# Patient Record
Sex: MALE | Race: WHITE
[De-identification: names, ages, dates, MRNs, and addresses within clinical notes are randomized per-mention and may not be internally consistent; named-entity substitution may affect disease eponyms.]

---

## 2018-10-28 ENCOUNTER — HOSPITAL ENCOUNTER (EMERGENCY)
Dept: HOSPITAL 74 - JERFT | Age: 18
Discharge: HOME | End: 2018-10-28
Payer: COMMERCIAL

## 2018-10-28 VITALS — BODY MASS INDEX: 24.3 KG/M2

## 2018-10-28 VITALS — SYSTOLIC BLOOD PRESSURE: 107 MMHG | DIASTOLIC BLOOD PRESSURE: 63 MMHG | TEMPERATURE: 98.1 F | HEART RATE: 63 BPM

## 2018-10-28 DIAGNOSIS — J30.2: Primary | ICD-10-CM

## 2018-10-28 NOTE — PDOC
History of Present Illness





- General


Chief Complaint: Respiratory


Stated Complaint: CONGESTION


Time Seen by Provider: 10/28/18 11:32


History Source: Patient, Family


Exam Limitations: No Limitations





- History of Present Illness


Initial Comments: 





10/28/18 11:53


Patient came in for evaluation of worsened ALLERGIC rhinitis, itchy eyes and 

congestion over the past few days. States also suffers from pollen ALLERGIES 

and is use some Benadryl with minimal resolved. Denies fever, denies any. The 

drainage from nose, but has a moist nonproductive cough. States last night felt 

tight and felt like he was wheezing.


Timing/Duration: reports: getting worse


Severity: reports: mild, moderate


Possible Cause: Yes: no prior episodes, allergen exposure, chronic episodes, 

occasional episodes


Modifying Factors: improves with: activity, coughing


Associated Symptoms: reports: denies symptoms, cough, nasal congestion, 

wheezing.  denies: fever/chills





Past History





- Travel


Traveled outside of the country in the last 30 days: No


Close contact w/someone who was outside of country & ill: No





- Past Medical History


Allergies/Adverse Reactions: 


 Allergies











Allergy/AdvReac Type Severity Reaction Status Date / Time


 


No Known Allergies Allergy   Verified 10/28/18 11:06











Home Medications: 


Ambulatory Orders





Albuterol Sulfate Inhaler - [Ventolin HFA Inhaler -] 1 - 2 inh PO Q4H #1 

inhaler 10/28/18 


Cetirizine HCl/Pseudoephedrine [Allergy+Congestion Relf-D Tab] 1 each PO DAILY #

30 tab 10/28/18 


predniSONE [Deltasone -] 20 mg PO BID #8 tablet 10/28/18 








COPD: No





- Suicide/Smoking/Psychosocial Hx


Smoking History: Never smoked





**Review of Systems





- Review of Systems


Able to Perform ROS?: Yes


Is the patient limited English proficient: Yes


Constitutional: Yes: Symptoms Reported, See HPI, Malaise.  No: Fever


HEENTM: Yes: Symptoms Reported, See HPI, Nose Congestion, Throat Pain


Respiratory: Yes: Symptoms reported, See HPI, Cough, Wheezing


ABD/GI: Yes: Symptoms Reported


Musculoskeletal: Yes: Symptoms Reported, See HPI


All Other Systems: Reviewed and Negative





*Physical Exam





- Vital Signs


 Last Vital Signs











Temp Pulse Resp BP Pulse Ox


 


 98.1 F   63   18   107/63   98 


 


 10/28/18 11:03  10/28/18 11:03  10/28/18 11:03  10/28/18 11:03  10/28/18 11:03














- Physical Exam


General Appearance: Yes: Nourished, Appropriately Dressed, Apparent Distress, 

Mild Distress, Moderate Distress


HEENT: positive: ANAHI (lastly, tearing, and mildly erythematous), Pharynx 

Normal (no redness, swelling or exudate, has some posterior sinus drainage that 

looks to be thick and clear)


Neck: positive: Supple, Lymphadenopathy (R), Lymphadenopathy (L).  negative: 

Tender


Respiratory/Chest: positive: Lungs Clear, Decreased Breath Sounds


Cardiovascular: positive: Regular Rate


Gastrointestinal/Abdominal: positive: Soft


Musculoskeletal: positive: Normal Inspection.  negative: CVA Tenderness


Extremity: positive: Normal Capillary Refill, Normal Inspection


Integumentary: positive: Dry, Warm, Pale


Neurologic: positive: CNs II-XII NML intact





Progress Note





- Progress Note


Progress Note: 





ALLERGIC rhinitis





Medical Decision Making





- Medical Decision Making





10/28/18 much improved after DuoNeb and prednisone. We'll continue antihistamine

, prednisone and albuterol until symptoms resolve








*DC/Admit/Observation/Transfer


Diagnosis at time of Disposition: 


Allergic rhinitis


Qualifiers:


 Allergic rhinitis trigger: unspecified Allergic rhinitis seasonality: seasonal 

Qualified Code(s): J30.2 - Other seasonal allergic rhinitis








- Discharge Dispostion


Disposition: HOME


Condition at time of disposition: Stable


Decision to Admit order: No





- Prescriptions


Prescriptions: 


Albuterol Sulfate Inhaler - [Ventolin HFA Inhaler -] 1 - 2 inh PO Q4H #1 inhaler


Cetirizine HCl/Pseudoephedrine [Allergy+Congestion Relf-D Tab] 1 each PO DAILY #

30 tab


predniSONE [Deltasone -] 20 mg PO BID #8 tablet





- Referrals


Referrals: 


Remberto Overton MD [Primary Care Provider] - 





- Patient Instructions


Printed Discharge Instructions:  DI for Allergic Rhinitis


Additional Instructions: 


Rest, drink lots of fluids: Teas, water, soups


Saltwater gargles. Consider humidifier in room at night


Steamy showers/seem to face break up mucus


Avoid contact with allergens, exposure to pollens, close windows on a windy day


Lots of handwashing and good hygiene





Continue over-the-counter medications for symptomatic relief- may use allergic 

eyedrops for itching I


Continue antihistamines daily until pollen season is over; Zyrtec, Claritin, 

Allegra during the daytime and Benadryl at nighttime as will make sleepy


Tylenol or Motrin for fever and pain





Followup with private physician in one to 2 days as needed


Consider following up with an allergist/dermatologist for skin testing and 

possible allergy shots


Return to emergency department for worsened symptoms, fevers, dehydration





- Post Discharge Activity


Forms/Work/School Notes:  Back to Work

## 2019-04-02 ENCOUNTER — HOSPITAL ENCOUNTER (EMERGENCY)
Dept: HOSPITAL 74 - JER | Age: 19
Discharge: HOME | End: 2019-04-02
Payer: SELF-PAY

## 2019-04-02 VITALS — BODY MASS INDEX: 25.9 KG/M2

## 2019-04-02 VITALS — HEART RATE: 77 BPM | TEMPERATURE: 97.5 F | DIASTOLIC BLOOD PRESSURE: 78 MMHG | SYSTOLIC BLOOD PRESSURE: 135 MMHG

## 2019-04-02 DIAGNOSIS — J06.9: Primary | ICD-10-CM

## 2019-04-02 DIAGNOSIS — B97.89: ICD-10-CM

## 2019-04-02 NOTE — PDOC
History of Present Illness





- General


Stated Complaint: COUGH


Time Seen by Provider: 04/02/19 04:12


History Source: Patient


Exam Limitations: No Limitations





- History of Present Illness


Initial Comments: 





04/02/19 04:22





HISTORY OF PRESENT ILLNESS: 18-year-old male denies medical history of presents 

emergency department for evaluation of dry cough for 3 weeks which is worsened 

over the past 5 days. Reports when he lays down the cough gets worse. He is 

tried taking over-the-counter cough suppressants and expectorants with minimal 

relief of symptoms. Patient also reports worsening scratchiness in his throat. 

Patient reports abdominal pain which worsens with coughing. He denies any fevers

, chills.





No recent travel or sick contacts. 


PAST MEDICAL HISTORY: Denies past medical history


SURGICAL HISTORY: Denies


ALLERGIES: No known drug allergies





REVIEW OF SYSTEMS


General/Constitutional: Denies fever or chills. Denies weakness, weight change.


HEENT: see HPI


Cardiovascular: Denies chest pain or shortness of breath.


Respiratory:see HPI


Gastrointestinal: Denies nausea, vomiting, diarrhea or constipation. Denies 

rectal bleeding.


Genitourinary: Denies dysuria, frequency, or change in urination.


Musculoskeletal: Denies joint or muscle swelling or pain. Denies neck or back 

pain.


Skin and breasts: Denies rash or easy bruising.


Neurologic: Denies headache, vertigo, loss of consciousness, or loss of 

sensation.


Psychiatric: Denies depression or anxiety.


Endocrine: Denies increased thirst. Denies abnormal weight change.


Hematologic/Lymphatic: Denies anemia, easy bleeding, or history of blood clots.


Allergic/Immunologic: Denies hives or skin allergy. Denies latex allergy.











PHYSICAL EXAM


General Appearance: Well-appearing, appropriately dressed.  No apparent distress

, no intoxication.


HEENT: EOMI, PERRLA. Nasal congestion present. Mildly erythematous oropharynx. 

No lesions or exudates present. Cobblestoning present in the posterior 

oropharynx.  No conjunctival pallor.  No photophobia, scleral icterus.


Neck: Supple.  Trachea midline. No tenderness, rigidity, carotid bruit, stridor

, lymphadenopathy, or thyromegaly. 


Respiratory/Chest: Lungs CTAB.  No shortness of breath, chest tenderness, 

respiratory distress, accessory muscle use. No crackles, rales, rhonchi, stridor

, wheezing, dullness


Cardiovascular: RRR. S1, S2.  No JVD, murmur, bradycardia, tachycardia.


Vascular Pulses: Dorsalis-Pedis (R): 2+, Dorsalis-Pedis (L): 2+


Gastrointestinal/Abdominal: Normal bowel sounds. Abdomen soft, non-distended.  

No tenderness or rebound tenderness. No organomegaly, pulsatile mass, guarding, 

hernia, hepatomegaly, splenomegaly.


Lymphatic: No adenopathy, tenderness.


Musculoskeletal/Extremities:  Normal inspection. FROM of all extremities, 

normal capillary refill.  Pelvis Stable.  No CVA tenderness. No tenderness to 

extremities, pedal edema, swelling, erythema or deformity.


Integumentary: Appropriate color, dry, warm.  No cyanosis, erythema, jaundice 

or rash


Neurologic: CNs II-XII intact. Fully oriented, alert.  Appropriate mood/affect. 

Motor strength 5/5.  No appreciable EOM palsy, facial droop or sensory deficit.





Past History





- Past Medical History


Allergies/Adverse Reactions: 


 Allergies











Allergy/AdvReac Type Severity Reaction Status Date / Time


 


No Known Allergies Allergy   Verified 04/02/19 05:09











Home Medications: 


Ambulatory Orders





Albuterol Sulfate Inhaler - [Ventolin HFA Inhaler -] 1 - 2 inh PO Q4H #1 

inhaler 10/28/18 


Cetirizine HCl/Pseudoephedrine [Allergy+Congestion Relf-D Tab] 1 each PO DAILY #

30 tab 10/28/18 


predniSONE [Deltasone -] 20 mg PO BID #8 tablet 10/28/18 


Benzonatate [Tessalon Pearls -] 200 mg PO TID #42 cap 04/02/19 








COPD: No





- Suicide/Smoking/Psychosocial Hx


Smoking History: Never smoked





Medical Decision Making





- Medical Decision Making





04/02/19 04:25


A/P:


18-year-old male with cough for 3 weeks





H&P is most consistent with upper respiratory infection with irritated airways 

due to mucous drainage. Symptoms were present for 3 weeks I will get a chest x-

ray to rule out pneumonia.








04/02/19 05:42


Chest x-ray as read by me: Angles clear. Cardiac silhouette is within normal 

limits. No focal deficits worsens or consolidations present.





I discussed the physical exam findings, ancillary test results and final 

diagnoses with the patient. I answered all of the patient's questions. The 

patient was satisfied with the care received and felt comfortable with the 

discharge plan and treatment plan. The patient will call their primary care 

physician within 24 hours to arrange follow-up and will return to the Emergency 

Department with any new, persistent or worsening symptoms. 





*DC/Admit/Observation/Transfer


Diagnosis at time of Disposition: 


URI (upper respiratory infection)


Qualifiers:


 URI type: unspecified viral URI Qualified Code(s): J06.9 - Acute upper 

respiratory infection, unspecified








- Discharge Dispostion


Disposition: HOME


Condition at time of disposition: Fair





- Prescriptions


Prescriptions: 


Benzonatate [Tessalon Pearls -] 200 mg PO TID #42 cap





- Referrals


Referrals: 


Remberto Overton MD [Primary Care Provider] - 





- Patient Instructions


Additional Instructions: 


Rest, drink lots of fluids: Teas, water, soups, Pedialyte


Saltwater gargles


Steamy showers/seem to face break up mucus


Avoid contact with others until fevers and cough resolved


Lots of handwashing and good hygiene





Continue over-the-counter medications for symptomatic relief


Tylenol or Motrin for fever and pain





Followup with private physician in one to 2 days as needed


Return to emergency department for worsened symptoms, fevers, dehydration





- Post Discharge Activity


Forms/Work/School Notes:  Back to Work

## 2019-04-02 NOTE — PDOC
Medical Decision Making





- Medical Decision Making





04/02/19 04:17


Patient seen by the advanced practice provider under my direct supervision. 

Ancillary testing reviewed as necessary.


I agree with plan as outlined by the advanced practice provider.





*DC/Admit/Observation/Transfer


Diagnosis at time of Disposition: 


 Cough








- Referrals


Referrals: 


Remberto Overton MD [Primary Care Provider] - 





- Patient Instructions





- Post Discharge Activity

## 2020-03-12 ENCOUNTER — HOSPITAL ENCOUNTER (EMERGENCY)
Dept: HOSPITAL 74 - JER | Age: 20
Discharge: HOME | End: 2020-03-12
Payer: COMMERCIAL

## 2020-03-12 VITALS — DIASTOLIC BLOOD PRESSURE: 71 MMHG | HEART RATE: 64 BPM | TEMPERATURE: 97.4 F | SYSTOLIC BLOOD PRESSURE: 119 MMHG

## 2020-03-12 VITALS — BODY MASS INDEX: 23.6 KG/M2

## 2020-03-12 DIAGNOSIS — R10.31: ICD-10-CM

## 2020-03-12 DIAGNOSIS — B97.89: ICD-10-CM

## 2020-03-12 DIAGNOSIS — J06.9: Primary | ICD-10-CM

## 2020-03-12 LAB
ALBUMIN SERPL-MCNC: 4.3 G/DL (ref 3.4–5)
ALP SERPL-CCNC: 57 U/L (ref 45–117)
ALT SERPL-CCNC: 51 U/L (ref 13–61)
ANION GAP SERPL CALC-SCNC: 3 MMOL/L (ref 8–16)
AST SERPL-CCNC: 15 U/L (ref 15–37)
BASOPHILS # BLD: 0.6 % (ref 0–2)
BILIRUB SERPL-MCNC: 0.5 MG/DL (ref 0.2–1)
BUN SERPL-MCNC: 14.8 MG/DL (ref 7–18)
CALCIUM SERPL-MCNC: 9.2 MG/DL (ref 8.5–10.1)
CHLORIDE SERPL-SCNC: 104 MMOL/L (ref 98–107)
CO2 SERPL-SCNC: 33 MMOL/L (ref 21–32)
CREAT SERPL-MCNC: 0.9 MG/DL (ref 0.55–1.3)
DEPRECATED RDW RBC AUTO: 12.2 % (ref 11.9–15.9)
EOSINOPHIL # BLD: 2.5 % (ref 0–4.5)
GLUCOSE SERPL-MCNC: 80 MG/DL (ref 74–106)
HCT VFR BLD CALC: 45.6 % (ref 35.4–49)
HGB BLD-MCNC: 15.9 GM/DL (ref 11.7–16.9)
LIPASE SERPL-CCNC: 110 U/L (ref 73–393)
LYMPHOCYTES # BLD: 21.7 % (ref 8–40)
MCH RBC QN AUTO: 31.1 PG (ref 25.7–33.7)
MCHC RBC AUTO-ENTMCNC: 34.9 G/DL (ref 32–35.9)
MCV RBC: 89.2 FL (ref 80–96)
MONOCYTES # BLD AUTO: 10 % (ref 3.8–10.2)
NEUTROPHILS # BLD: 65.2 % (ref 42.8–82.8)
PLATELET # BLD AUTO: 200 K/MM3 (ref 134–434)
PMV BLD: 7.4 FL (ref 7.5–11.1)
POTASSIUM SERPLBLD-SCNC: 4.4 MMOL/L (ref 3.5–5.1)
PROT SERPL-MCNC: 7.7 G/DL (ref 6.4–8.2)
RBC # BLD AUTO: 5.11 M/MM3 (ref 4–5.6)
SODIUM SERPL-SCNC: 140 MMOL/L (ref 136–145)
WBC # BLD AUTO: 5 K/MM3 (ref 4–10)

## 2020-03-12 PROCEDURE — 3E033NZ INTRODUCTION OF ANALGESICS, HYPNOTICS, SEDATIVES INTO PERIPHERAL VEIN, PERCUTANEOUS APPROACH: ICD-10-PCS | Performed by: STUDENT IN AN ORGANIZED HEALTH CARE EDUCATION/TRAINING PROGRAM

## 2020-03-12 PROCEDURE — 3E0F7GC INTRODUCTION OF OTHER THERAPEUTIC SUBSTANCE INTO RESPIRATORY TRACT, VIA NATURAL OR ARTIFICIAL OPENING: ICD-10-PCS | Performed by: STUDENT IN AN ORGANIZED HEALTH CARE EDUCATION/TRAINING PROGRAM

## 2020-03-12 NOTE — PDOC
*Physical Exam





- Vital Signs


                                Last Vital Signs











Temp Pulse Resp BP Pulse Ox


 


 97.4 F L  64   17   119/71   99 


 


 03/12/20 10:32  03/12/20 10:32  03/12/20 10:32  03/12/20 10:32  03/12/20 10:32














- Physical Exam


General Appearance: No: Apparent Distress


HEENT: positive: Normal Voice, Pharyngeal Erythema (minimal), Nasal Congestion. 

negative: Muffled/Hoarse voice, Tonsillar Exudate, Tonsillar Erythema


Respiratory/Chest: positive: Lungs Clear, Normal Breath Sounds.  negative: 

Respiratory Distress


Cardiovascular: positive: Regular Rhythm, Regular Rate, S1, S2.  negative: 

Murmur


Gastrointestinal/Abdominal: positive: Tender (along RLQ), Soft.  negative: 

Distended


Integumentary: positive: Normal Color


Neurologic: positive: Alert





<Leonor Monk - Last Filed: 03/12/20 12:51>





- Vital Signs


                                Last Vital Signs











Temp Pulse Resp BP Pulse Ox


 


 97.4 F L  64   17   119/71   99 


 


 03/12/20 10:32  03/12/20 10:32  03/12/20 10:32  03/12/20 10:32  03/12/20 10:32














<Prashanth Herr - Last Filed: 03/12/20 14:54>





ED Treatment Course





- LABORATORY


CBC & Chemistry Diagram: 


                                 03/12/20 11:50





                                 03/12/20 11:50





- RADIOLOGY


Radiology Studies Ordered: 














 Category Date Time Status


 


 PELVIC / BLADDER US [US] Stat Ultrasound  03/12/20 11:54 Ordered














<Leonor Monk - Last Filed: 03/12/20 12:51>





- LABORATORY


CBC & Chemistry Diagram: 


                                 03/12/20 11:50





                                 03/12/20 11:50





- ADDITIONAL ORDERS


Additional order review: 


                               Laboratory  Results











  03/12/20





  11:50


 


Sodium  140


 


Potassium  4.4


 


Chloride  104


 


Carbon Dioxide  33 H


 


Anion Gap  3 L


 


BUN  14.8


 


Creatinine  0.9


 


Est GFR (CKD-EPI)AfAm  141.01


 


Est GFR (CKD-EPI)NonAf  121.66


 


Random Glucose  80


 


Calcium  9.2


 


Total Bilirubin  0.5


 


AST  15


 


ALT  51


 


Alkaline Phosphatase  57


 


Total Protein  7.7


 


Albumin  4.3


 


Lipase  110








                                        











  03/12/20





  11:50


 


RBC  5.11


 


MCV  89.2


 


MCHC  34.9


 


RDW  12.2


 


MPV  7.4 L


 


Neutrophils %  65.2


 


Lymphocytes %  21.7


 


Monocytes %  10.0


 


Eosinophils %  2.5


 


Basophils %  0.6














- Medications


Given in the ED: 


ED Medications














Discontinued Medications














Generic Name Dose Route Start Last Admin





  Trade Name Osbaldo  PRN Reason Stop Dose Admin


 


Acetaminophen  1,000 mg  03/12/20 11:28  03/12/20 12:10





  Ofirmev Injection -  IVPB  03/12/20 11:29  1,000 mg





  ONCE ONE   Administration


 


Sodium Chloride  1,000 mls @ 1,000 mls/hr  03/12/20 11:25  03/12/20 12:10





  Normal Saline -  IV  03/12/20 12:24  1,000 mls/hr





  ASDIR STA   Administration


 


Ondansetron HCl  4 mg  03/12/20 11:25  03/12/20 12:10





  Zofran Odt -  SL  03/12/20 11:26  4 mg





  ONCE ONE   Administration


 


Sodium Chloride  3 ml  03/12/20 11:54  03/12/20 12:37





  Normal Saline For Inhalation -  IH  03/12/20 11:55  3 ml





  ONCE ONE   Administration














<Prashanth Herr - Last Filed: 03/12/20 14:54>





Medical Decision Making





- Medical Decision Making








Patient signed out to me by MAURA Johnson


Patient with 3 days for sore throat, dry cough along with RLQ abdominal pain, 

nausea and decreased appetite; denies fever, vomiting, diarrhea, urinary sxs, 

testicular pain; denies prior abdominal surgeries


Rapid strep negative


Patient pending results of labs


Will get Zofran for nausea and Tylenol for pain





Patient also endorses dry cough and dryness in throat; lungs CTAB; will trial 

saline neb and reassess





03/12/20 12:09





Labs reviewed unremarkable


On reassessment abdomen is soft and nontender


Patient denies any pain and is feeling better


Return precautions discussed with patient


Stable for discharge





03/12/20 12:51








<Leonor Monk - Last Filed: 03/12/20 12:51>





- Medical Decision Making








03/12/20 14:53


The patient was seen and evaluated in conjunction with TASHA Monk under my 

direct supervision, ancillary studies were reviewed.I agree with the plan as 

outlined by TASHA Monk .





<Prashanth Herr - Last Filed: 03/12/20 14:54>





Discharge





- Discharge Information


Problems reviewed: Yes





- Admission


No





- Additional Discharge Information


Prescription Drug Monitoring Program (I-STOP) results: I-STOP not reviewed





<Leonor Monk - Last Filed: 03/12/20 12:51>





<Prashanth Herr - Last Filed: 03/12/20 14:54>





- Discharge Information


Clinical Impression/Diagnosis: 


 Viral URI





Abdominal pain


Qualifiers:


 Abdominal location: right lower quadrant Qualified Code(s): R10.31 - Right 

lower quadrant pain





Condition: Stable


Disposition: HOME





- Patient Discharge Instructions


Patient Printed Discharge Instructions:  DI for Viral Upper Respiratory 

Infection -- Adult, DI for Abdominal Pain-Adult


Additional Instructions: 


Thank you for choosing [facility].  It was a pleasure taking 

care of you.  





You have viral infection


Recommend salt water gargles, lozenges, lemon honey tea, Robitussin as needed 

for symptoms 


Recommend rest and hydration


Follow-up with your doctor in 2 days





Return to the Emergency Department if your symptoms worsen or persist or have 

other concerning symptoms.

## 2020-03-12 NOTE — PDOC
History of Present Illness





- General


Chief Complaint: Sore Throat


Stated Complaint: THROAT


Time Seen by Provider: 03/12/20 10:46


History Source: Patient





- History of Present Illness


Initial Comments: 





03/12/20 11:34


19-year-old male complaining of nausea, sore throat, abdominal pain, generalized

malaise for the last 3 days.Denies vomiting, diarrhea, fever, urinary symptoms, 

testicular pain


Patient has a past medical history of asthma








Past History





- Past Medical History


Allergies/Adverse Reactions: 


                                    Allergies











Allergy/AdvReac Type Severity Reaction Status Date / Time


 


No Known Allergies Allergy   Verified 03/12/20 10:34











Home Medications: 


Ambulatory Orders





Albuterol Sulfate Inhaler - [Ventolin HFA Inhaler -] 1 - 2 inh PO Q4H #1 inhaler

10/28/18 


Cetirizine HCl/Pseudoephedrine [Allergy+Congestion Relf-D Tab] 1 each PO DAILY 

#30 tab 10/28/18 


predniSONE [Deltasone -] 20 mg PO BID #8 tablet 10/28/18 


Benzonatate [Tessalon Pearls -] 200 mg PO TID #42 cap 04/02/19 








COPD: No





- Psycho Social/Smoking Cessation Hx


Smoking History: Never smoked


Have you smoked in the past 12 months: No


Information on smoking cessation initiated: No


Hx Alcohol Use: No


Drug/Substance Use Hx: No





*Physical Exam





- Vital Signs


                                Last Vital Signs











Temp Pulse Resp BP Pulse Ox


 


 97.4 F L  64   17   119/71   99 


 


 03/12/20 10:32  03/12/20 10:32  03/12/20 10:32  03/12/20 10:32  03/12/20 10:32














- Physical Exam


General Appearance: Yes: Appropriately Dressed


HEENT: positive: Pharyngeal Erythema (with tonsillar edema. no kissing tonsils)


Respiratory/Chest: positive: Lungs Clear, Normal Breath Sounds


Gastrointestinal/Abdominal: positive: Normal Bowel Sounds, Tender (RLQ pain), 

Soft


Extremity: positive: Normal Capillary Refill, Normal Inspection, Normal Range of

Motion


Integumentary: positive: Normal Color, Dry, Warm


Neurologic: positive: Fully Oriented, Alert, Normal Mood/Affect





ED Treatment Course





- LABORATORY


CBC & Chemistry Diagram: 


                                 03/12/20 11:50





                                 03/12/20 11:50





Medical Decision Making





- Medical Decision Making


A: abdominal pain; strep








P: cbc





cmp








rapid strep








IVF


zofran








tylenol


03/12/20 11:42


patient  signed out to Leonor CORDOVA


03/12/20 14:49








Discharge





- Discharge Information


Problems reviewed: Yes


Clinical Impression/Diagnosis: 


 Viral URI





Abdominal pain


Qualifiers:


 Abdominal location: right lower quadrant Qualified Code(s): R10.31 - Right 

lower quadrant pain





Condition: Stable


Disposition: HOME





- Follow up/Referral





- Patient Discharge Instructions


Patient Printed Discharge Instructions:  DI for Viral Upper Respiratory 

Infection -- Adult, DI for Abdominal Pain-Adult


Additional Instructions: 


Thank you for choosing [facility].  It was a pleasure taking 

care of you.  





You have viral infection


Recommend salt water gargles, lozenges, lemon honey tea, Robitussin as needed 

for symptoms 


Recommend rest and hydration


Follow-up with your doctor in 2 days





Return to the Emergency Department if your symptoms worsen or persist or have 

other concerning symptoms. 





- Post Discharge Activity

## 2021-07-12 ENCOUNTER — HOSPITAL ENCOUNTER (OUTPATIENT)
Dept: HOSPITAL 74 - JER | Age: 21
Setting detail: OBSERVATION
Discharge: LEFT BEFORE DISCHARGE | End: 2021-07-12
Attending: GENERAL ACUTE CARE HOSPITAL | Admitting: GENERAL ACUTE CARE HOSPITAL
Payer: COMMERCIAL

## 2021-07-12 VITALS — SYSTOLIC BLOOD PRESSURE: 108 MMHG | DIASTOLIC BLOOD PRESSURE: 52 MMHG | HEART RATE: 56 BPM

## 2021-07-12 VITALS — BODY MASS INDEX: 25.1 KG/M2

## 2021-07-12 VITALS — TEMPERATURE: 97.9 F

## 2021-07-12 DIAGNOSIS — R55: Primary | ICD-10-CM

## 2021-07-12 DIAGNOSIS — F12.10: ICD-10-CM

## 2021-07-12 LAB
ALBUMIN SERPL-MCNC: 4.4 G/DL (ref 3.4–5)
ALP SERPL-CCNC: 51 U/L (ref 45–117)
ALT SERPL-CCNC: 35 U/L (ref 13–61)
ANION GAP SERPL CALC-SCNC: 6 MMOL/L (ref 8–16)
AST SERPL-CCNC: 14 U/L (ref 15–37)
BASOPHILS # BLD: 0.3 % (ref 0–2)
BILIRUB SERPL-MCNC: 0.7 MG/DL (ref 0.2–1)
BUN SERPL-MCNC: 19.2 MG/DL (ref 7–18)
CALCIUM SERPL-MCNC: 9.1 MG/DL (ref 8.5–10.1)
CHLORIDE SERPL-SCNC: 105 MMOL/L (ref 98–107)
CO2 SERPL-SCNC: 29 MMOL/L (ref 21–32)
CREAT SERPL-MCNC: 0.9 MG/DL (ref 0.55–1.3)
DEPRECATED RDW RBC AUTO: 11.9 % (ref 11.9–15.9)
EOSINOPHIL # BLD: 0.6 % (ref 0–4.5)
GLUCOSE SERPL-MCNC: 99 MG/DL (ref 74–106)
HCT VFR BLD CALC: 42.3 % (ref 35.4–49)
HGB BLD-MCNC: 14.8 GM/DL (ref 11.7–16.9)
LYMPHOCYTES # BLD: 13.6 % (ref 8–40)
MCH RBC QN AUTO: 31.2 PG (ref 25.7–33.7)
MCHC RBC AUTO-ENTMCNC: 35.1 G/DL (ref 32–35.9)
MCV RBC: 88.9 FL (ref 80–96)
MONOCYTES # BLD AUTO: 6.8 % (ref 3.8–10.2)
NEUTROPHILS # BLD: 78.7 % (ref 42.8–82.8)
PLATELET # BLD AUTO: 200 10^3/UL (ref 134–434)
PMV BLD: 7.6 FL (ref 7.5–11.1)
PROT SERPL-MCNC: 7.5 G/DL (ref 6.4–8.2)
RBC # BLD AUTO: 4.76 M/MM3 (ref 4–5.6)
SODIUM SERPL-SCNC: 139 MMOL/L (ref 136–145)
WBC # BLD AUTO: 8.5 K/MM3 (ref 4–10)

## 2021-07-12 PROCEDURE — U0003 INFECTIOUS AGENT DETECTION BY NUCLEIC ACID (DNA OR RNA); SEVERE ACUTE RESPIRATORY SYNDROME CORONAVIRUS 2 (SARS-COV-2) (CORONAVIRUS DISEASE [COVID-19]), AMPLIFIED PROBE TECHNIQUE, MAKING USE OF HIGH THROUGHPUT TECHNOLOGIES AS DESCRIBED BY CMS-2020-01-R: HCPCS

## 2021-07-12 PROCEDURE — G0378 HOSPITAL OBSERVATION PER HR: HCPCS

## 2021-07-12 PROCEDURE — C9803 HOPD COVID-19 SPEC COLLECT: HCPCS

## 2021-07-12 PROCEDURE — U0005 INFEC AGEN DETEC AMPLI PROBE: HCPCS

## 2021-07-12 PROCEDURE — 3E0337Z INTRODUCTION OF ELECTROLYTIC AND WATER BALANCE SUBSTANCE INTO PERIPHERAL VEIN, PERCUTANEOUS APPROACH: ICD-10-PCS | Performed by: GENERAL ACUTE CARE HOSPITAL
